# Patient Record
Sex: MALE | Race: WHITE | NOT HISPANIC OR LATINO | Employment: FULL TIME | URBAN - NONMETROPOLITAN AREA
[De-identification: names, ages, dates, MRNs, and addresses within clinical notes are randomized per-mention and may not be internally consistent; named-entity substitution may affect disease eponyms.]

---

## 2017-02-01 ENCOUNTER — OFFICE VISIT (OUTPATIENT)
Dept: FAMILY MEDICINE CLINIC | Facility: CLINIC | Age: 27
End: 2017-02-01

## 2017-02-01 VITALS
SYSTOLIC BLOOD PRESSURE: 118 MMHG | TEMPERATURE: 97.8 F | BODY MASS INDEX: 25.53 KG/M2 | HEART RATE: 89 BPM | WEIGHT: 192.6 LBS | OXYGEN SATURATION: 95 % | DIASTOLIC BLOOD PRESSURE: 88 MMHG | HEIGHT: 73 IN

## 2017-02-01 DIAGNOSIS — J40 BRONCHITIS: Primary | ICD-10-CM

## 2017-02-01 PROCEDURE — 99203 OFFICE O/P NEW LOW 30 MIN: CPT | Performed by: FAMILY MEDICINE

## 2017-02-01 PROCEDURE — 96372 THER/PROPH/DIAG INJ SC/IM: CPT | Performed by: FAMILY MEDICINE

## 2017-02-01 RX ORDER — LEVOFLOXACIN 750 MG/1
750 TABLET ORAL DAILY
Qty: 7 TABLET | Refills: 0 | Status: SHIPPED | OUTPATIENT
Start: 2017-02-01 | End: 2018-08-07

## 2017-02-01 RX ORDER — BENZONATATE 200 MG/1
200 CAPSULE ORAL 3 TIMES DAILY PRN
Qty: 21 CAPSULE | Refills: 0 | Status: SHIPPED | OUTPATIENT
Start: 2017-02-01 | End: 2018-08-07

## 2017-02-01 RX ORDER — METHYLPREDNISOLONE ACETATE 80 MG/ML
80 INJECTION, SUSPENSION INTRA-ARTICULAR; INTRALESIONAL; INTRAMUSCULAR; SOFT TISSUE ONCE
Status: COMPLETED | OUTPATIENT
Start: 2017-02-01 | End: 2017-02-01

## 2017-02-01 RX ADMIN — METHYLPREDNISOLONE ACETATE 80 MG: 80 INJECTION, SUSPENSION INTRA-ARTICULAR; INTRALESIONAL; INTRAMUSCULAR; SOFT TISSUE at 11:10

## 2017-02-01 NOTE — PROGRESS NOTES
Subjective   Vince Haider is a 26 y.o. male.   Chief Complaint   Patient presents with   • Establish Care   • Sore Throat     all started about 3 weeks ago, started out with a fever   • URI   • Night Sweats   • Dizziness       URI    This is a new problem. The current episode started 1 to 4 weeks ago. The problem has been waxing and waning. There has been no fever. Associated symptoms include congestion, coughing, rhinorrhea, sinus pain and a sore throat. Pertinent negatives include no headaches or plugged ear sensation. He has tried decongestant for the symptoms. The treatment provided mild relief.        The following portions of the patient's history were reviewed and updated as appropriate: allergies, current medications, past family history, past medical history, past social history, past surgical history and problem list.    Review of Systems   Constitutional: Negative.    HENT: Positive for congestion, rhinorrhea and sore throat.    Eyes: Negative.    Respiratory: Positive for cough.    Cardiovascular: Negative.    Gastrointestinal: Negative.    Endocrine: Negative.    Genitourinary: Negative.    Musculoskeletal: Negative.    Skin: Negative.    Allergic/Immunologic: Negative.    Neurological: Negative.  Negative for headaches.   Hematological: Negative.    Psychiatric/Behavioral: Negative.    All other systems reviewed and are negative.      Objective   Physical Exam   Constitutional: He is oriented to person, place, and time. He appears well-developed and well-nourished.   HENT:   Head: Normocephalic and atraumatic.   Right Ear: External ear normal.   Left Ear: External ear normal.   Nose: Mucosal edema and rhinorrhea present. Right sinus exhibits maxillary sinus tenderness. Left sinus exhibits maxillary sinus tenderness.   Mouth/Throat: Posterior oropharyngeal edema and posterior oropharyngeal erythema present.   Eyes: Conjunctivae and EOM are normal. Pupils are equal, round, and reactive to  light.   Neck: Normal range of motion. Neck supple.   Cardiovascular: Normal rate, regular rhythm, normal heart sounds and intact distal pulses.  Exam reveals no gallop and no friction rub.    No murmur heard.  Pulmonary/Chest: Effort normal. He has no wheezes. He has rhonchi. He has no rales.   Abdominal: Soft. Bowel sounds are normal. He exhibits no mass. There is no tenderness. There is no rebound and no guarding.   Musculoskeletal: Normal range of motion.   Neurological: He is alert and oriented to person, place, and time. He has normal reflexes. No cranial nerve deficit. He exhibits normal muscle tone.   Skin: Skin is warm and dry. No rash noted.   Psychiatric: He has a normal mood and affect. His behavior is normal. Judgment and thought content normal.   Nursing note and vitals reviewed.      Assessment/Plan   Vince was seen today for establish care, sore throat, uri, night sweats and dizziness.    Diagnoses and all orders for this visit:    Bronchitis  -     methylPREDNISolone acetate (DEPO-medrol) injection 80 mg; Inject 1 mL into the shoulder, thigh, or buttocks 1 (One) Time.    Other orders  -     levoFLOXacin (LEVAQUIN) 750 MG tablet; Take 1 tablet by mouth Daily.  -     benzonatate (TESSALON) 200 MG capsule; Take 1 capsule by mouth 3 (Three) Times a Day As Needed for cough.

## 2018-08-07 ENCOUNTER — OFFICE VISIT (OUTPATIENT)
Dept: FAMILY MEDICINE CLINIC | Facility: CLINIC | Age: 28
End: 2018-08-07

## 2018-08-07 VITALS
SYSTOLIC BLOOD PRESSURE: 142 MMHG | HEIGHT: 73 IN | TEMPERATURE: 98 F | WEIGHT: 202 LBS | OXYGEN SATURATION: 99 % | BODY MASS INDEX: 26.77 KG/M2 | HEART RATE: 99 BPM | DIASTOLIC BLOOD PRESSURE: 82 MMHG

## 2018-08-07 DIAGNOSIS — H93.13 TINNITUS OF BOTH EARS: ICD-10-CM

## 2018-08-07 DIAGNOSIS — M25.561 CHRONIC PAIN OF BOTH KNEES: ICD-10-CM

## 2018-08-07 DIAGNOSIS — G89.29 CHRONIC PAIN OF BOTH KNEES: ICD-10-CM

## 2018-08-07 DIAGNOSIS — R03.0 ELEVATED BLOOD PRESSURE READING: Primary | ICD-10-CM

## 2018-08-07 DIAGNOSIS — M25.562 CHRONIC PAIN OF BOTH KNEES: ICD-10-CM

## 2018-08-07 PROCEDURE — 99213 OFFICE O/P EST LOW 20 MIN: CPT | Performed by: FAMILY MEDICINE

## 2018-08-07 NOTE — PROGRESS NOTES
Subjective   Vince Haider is a 28 y.o. male.   Chief Complaint   Patient presents with   • Annual Exam     [facility]    • Hypertension     x 2 weeks    • Knee Pain     with exercise    • tramatic brain injury     needs MRI      \  History of Present Illness   Patient presents today for multiple complaints.  He is complaining of bilateral knee pain that is worsening over the last several months.  He has tried over-the-counter NSAIDs with some relief.  Also complaining of ringing in the years that have been going on for many years since  service.  He is also had some elevated blood pressure readings both systolic and diastolic over the last 2-3 months.  He denies symptoms including headache, chest pain, or shortness of breath.  The following portions of the patient's history were reviewed and updated as appropriate: allergies, current medications, past family history, past medical history, past social history, past surgical history and problem list.    Review of Systems   Constitutional: Positive for fatigue.   HENT: Positive for tinnitus.    Eyes: Negative.    Respiratory: Negative.    Cardiovascular: Negative.    Gastrointestinal: Negative.    Endocrine: Negative.    Genitourinary: Negative.    Musculoskeletal: Positive for arthralgias.   Skin: Negative.    Allergic/Immunologic: Negative.    Neurological: Negative.    Hematological: Negative.    Psychiatric/Behavioral: Positive for decreased concentration.   All other systems reviewed and are negative.      Objective   Physical Exam   Constitutional: He is oriented to person, place, and time. He appears well-developed and well-nourished.   HENT:   Head: Normocephalic and atraumatic.   Right Ear: External ear normal.   Left Ear: External ear normal.   Nose: Nose normal.   Mouth/Throat: Oropharynx is clear and moist.   EAC ceruminous luis alberto   Eyes: Pupils are equal, round, and reactive to light. Conjunctivae and EOM are normal.    Neck: Normal range of motion. Neck supple.   Cardiovascular: Normal rate, regular rhythm, normal heart sounds and intact distal pulses.  Exam reveals no gallop and no friction rub.    No murmur heard.  Pulmonary/Chest: Effort normal and breath sounds normal. He has no wheezes. He has no rales.   Abdominal: Soft. Bowel sounds are normal. He exhibits no mass. There is no tenderness. There is no rebound and no guarding.   Musculoskeletal: Normal range of motion.   Neurological: He is alert and oriented to person, place, and time. He has normal reflexes. No cranial nerve deficit. He exhibits normal muscle tone.   Skin: Skin is warm and dry. No rash noted.   Psychiatric: He has a normal mood and affect. His behavior is normal. Judgment and thought content normal.   Nursing note and vitals reviewed.      Assessment/Plan   Vince was seen today for annual exam, hypertension, knee pain and tramatic brain injury.    Diagnoses and all orders for this visit:    Elevated blood pressure reading  -     CBC & Differential; Future  -     Comprehensive Metabolic Panel; Future  -     Lipid Panel; Future  -     TSH; Future  -     Vitamin B12; Future    Tinnitus of both ears  -     CT head w contrast; Future  -     Ambulatory Referral to ENT (Otolaryngology)  -     XR Knee 1 or 2 View Bilateral; Future    Chronic pain of both knees        Continue with OTC NSAIDs.  Patient instructed to monitor blood pressure twice daily and log, normal parameters are given.

## 2018-08-09 DIAGNOSIS — H93.13 TINNITUS OF BOTH EARS: Primary | ICD-10-CM

## 2018-11-02 ENCOUNTER — CLINICAL SUPPORT (OUTPATIENT)
Dept: AUDIOLOGY | Facility: CLINIC | Age: 28
End: 2018-11-02

## 2018-11-02 ENCOUNTER — OFFICE VISIT (OUTPATIENT)
Dept: OTOLARYNGOLOGY | Facility: CLINIC | Age: 28
End: 2018-11-02

## 2018-11-02 VITALS — WEIGHT: 205 LBS | HEIGHT: 73 IN | BODY MASS INDEX: 27.17 KG/M2 | OXYGEN SATURATION: 99 %

## 2018-11-02 DIAGNOSIS — H61.23 BILATERAL IMPACTED CERUMEN: ICD-10-CM

## 2018-11-02 DIAGNOSIS — H93.13 TINNITUS OF BOTH EARS: Primary | ICD-10-CM

## 2018-11-02 DIAGNOSIS — H93.13 TINNITUS, BILATERAL: Primary | ICD-10-CM

## 2018-11-02 DIAGNOSIS — Z01.118 ENCOUNTER FOR EXAMINATION OF HEARING WITH ABNORMAL FINDINGS: ICD-10-CM

## 2018-11-02 PROCEDURE — 92567 TYMPANOMETRY: CPT | Performed by: AUDIOLOGIST

## 2018-11-02 PROCEDURE — 92557 COMPREHENSIVE HEARING TEST: CPT | Performed by: AUDIOLOGIST

## 2018-11-02 PROCEDURE — 69210 REMOVE IMPACTED EAR WAX UNI: CPT | Performed by: OTOLARYNGOLOGY

## 2018-11-02 PROCEDURE — 99203 OFFICE O/P NEW LOW 30 MIN: CPT | Performed by: OTOLARYNGOLOGY

## 2018-11-02 NOTE — PROGRESS NOTES
STANDARD AUDIOMETRIC EVALUATION      Name:  Vince Haider  :  1990  Age:  28 y.o.  Date of Evaluation:  2018      HISTORY    Reason for visit:  Vince Haider is seen today for a hearing evaluation at the request of Dr. Rohith Salmon.  Patient reports he hears ringing in his ears, and some days the ringing is so loud he can't hear someone talking.  He states he has a positive history of noise exposure In the  where he was exposed to gun shots and explosions, and he states he did not always wear hearing protection.  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        EVALUATION    See Audiogram    RESULTS        Otoscopy and Tympanometry 226 Hz :  Right Ear:  Otoscopy:  Cerumen impaction, Testing completed after ears were cleaned          Tympanometry:  Middle ear function within normal limits    Left Ear:   Otoscopy:  Cerumen impaction, Testing completed after ears were cleaned        Tympanometry:  Middle ear function within normal limits    Test technique:  Standard Audiometry     Pure Tone Audiometry:   Patient responded to pure tones at 20-25 dB for 250-8000 Hz in both ears.      Speech Audiometry:        Right Ear:  Speech Reception Threshold (SRT) was obtained at 20 dBHL                 Speech Discrimination scores were 100% in quiet when words were presented at 60 dBHL       Left Ear:  Speech Reception Threshold (SRT) was obtained at 20 dBHL                 Speech Discrimination scores were 100% in quiet when words were presented at 60 dBHL    Reliability:   good    IMPRESSIONS:  1.  Tympanometry results are consistent with Middle ear function within normal limits in both ears.  2.  Pure tone results are consistent with borderline within normal limits flat   hearing loss with sensorineural component for both ears.       RECOMMENDATIONS:  Patient is seeing the Ear Nose and Throat physician immediately  following this examination.  It was a pleasure seeing Vince Haider in Audiology today.  We would be happy to do further testing or discuss these test as necessary.          This document has been electronically signed by Leeann Nevarez MS CCC-A on November 2, 2018 11:25 AM       Leeann Nevarez MS CCC-A  Licensed Audiologist

## 2018-11-05 NOTE — PROGRESS NOTES
Subjective   Vince Haider is a 28 y.o. male.     History of Present Illness   Is here for evaluation of tinnitus.  He states that this is been present in both ears since 2010 and seems to be getting worse.  Has a history of noise exposure in the .  Did not always wear hearing protection.  He does feel like his hearing is decreased chronically..  No otorrhea, no otalgia, no previous otologic surgery.  Symptoms are worse in a quiet environment.      The following portions of the patient's history were reviewed and updated as appropriate: allergies, current medications, past family history, past medical history, past social history, past surgical history and problem list.      Vince Haider reports that he has never smoked. He has never used smokeless tobacco. He reports that he drinks alcohol. He reports that he does not use drugs.  Patient is not a tobacco user and has not been counseled for use of tobacco products    Family History   Problem Relation Age of Onset   • Thyroid disease Mother    • No Known Problems Father    • No Known Problems Sister    • No Known Problems Brother    • No Known Problems Daughter    • No Known Problems Son    • No Known Problems Maternal Aunt    • No Known Problems Maternal Uncle    • No Known Problems Paternal Aunt    • No Known Problems Paternal Uncle    • No Known Problems Maternal Grandmother    • Heart attack Maternal Grandfather    • No Known Problems Paternal Grandmother    • No Known Problems Paternal Grandfather        Allergies   Allergen Reactions   • Penicillins Rash       No current outpatient prescriptions on file.    Past Medical History:   Diagnosis Date   • Sebaceous cyst     postoperative           Review of Systems   Constitutional: Negative for fever.   HENT: Positive for tinnitus.    All other systems reviewed and are negative.          Objective   Physical Exam  General: Well-developed well-nourished male in no acute distress.  Alert and  oriented ×3. Head: Normocephalic. Face: Symmetrical strength and appearance. PERRL. EOMI. Voice:Strong. Speech:Fluent  Ears: External ears no deformity, both ear canals show dense cerumen impactions  Using the binocular microscope for visualization, cerumen impaction was removed from bilateral ear canal(s) using instrumentation. This was personally performed by Rohith Salmon MD   Following cerumen removal tympanic membranes were noted to be intact clear and mobile.  Nose: Nares show no discharge mass polyp or purulence.  Boggy mucosa is present.  No gross external deformity.  Septum: Midline  Oral cavity: Lips and gums without lesions.  Tongue and floor of mouth without lesions.  Parotid and submandibular ducts unobstructed.  No mucosal lesions on the buccal mucosa or vestibule of the mouth.  Pharynx: No erythema exudate mass or ulcer  Neck: No lymphadenopathy.  No thyromegaly.  Trachea and larynx midline.  No masses in the parotid or submandibular glands.  Audiogram is obtained and reviewed and shows borderline normal hearing in both ears.  Tympanograms are type A bilaterally.  Discrimination scores are 100% bilaterally.      Assessment/Plan   Vince was seen today for ear problem.    Diagnoses and all orders for this visit:    Tinnitus of both ears    Bilateral impacted cerumen      Plan: Cerumen removed as described above.  Explained the nature of tinnitus to the patient and its relationship hearing loss.  I'm hopeful that the cerumen removal will help at least to some extent but if his symptoms are still present he should masking noise as needed for the tinnitus.  He should avoid unnecessary exposure loud noise and use ear protection when unavoidably around loud noise.  Follow-up with me as needed for worsening symptoms.